# Patient Record
Sex: FEMALE | Race: BLACK OR AFRICAN AMERICAN | NOT HISPANIC OR LATINO | URBAN - METROPOLITAN AREA
[De-identification: names, ages, dates, MRNs, and addresses within clinical notes are randomized per-mention and may not be internally consistent; named-entity substitution may affect disease eponyms.]

---

## 2017-03-20 ENCOUNTER — OUTPATIENT (OUTPATIENT)
Dept: OUTPATIENT SERVICES | Facility: HOSPITAL | Age: 77
LOS: 1 days | End: 2017-03-20
Payer: MEDICARE

## 2017-03-20 ENCOUNTER — APPOINTMENT (OUTPATIENT)
Dept: ULTRASOUND IMAGING | Facility: CLINIC | Age: 77
End: 2017-03-20

## 2017-03-20 DIAGNOSIS — Z00.8 ENCOUNTER FOR OTHER GENERAL EXAMINATION: ICD-10-CM

## 2017-03-20 PROCEDURE — 76536 US EXAM OF HEAD AND NECK: CPT

## 2017-03-21 ENCOUNTER — FORM ENCOUNTER (OUTPATIENT)
Age: 77
End: 2017-03-21

## 2017-12-11 ENCOUNTER — OUTPATIENT (OUTPATIENT)
Dept: OUTPATIENT SERVICES | Facility: HOSPITAL | Age: 77
LOS: 1 days | End: 2017-12-11
Payer: MEDICARE

## 2017-12-11 ENCOUNTER — APPOINTMENT (OUTPATIENT)
Dept: MAMMOGRAPHY | Facility: CLINIC | Age: 77
End: 2017-12-11
Payer: MEDICARE

## 2017-12-11 DIAGNOSIS — Z00.8 ENCOUNTER FOR OTHER GENERAL EXAMINATION: ICD-10-CM

## 2017-12-11 PROCEDURE — 77063 BREAST TOMOSYNTHESIS BI: CPT

## 2017-12-11 PROCEDURE — G0202: CPT | Mod: 26

## 2017-12-11 PROCEDURE — 77063 BREAST TOMOSYNTHESIS BI: CPT | Mod: 26

## 2017-12-11 PROCEDURE — 77067 SCR MAMMO BI INCL CAD: CPT

## 2018-03-05 ENCOUNTER — APPOINTMENT (OUTPATIENT)
Dept: ULTRASOUND IMAGING | Facility: CLINIC | Age: 78
End: 2018-03-05
Payer: MEDICARE

## 2018-03-05 ENCOUNTER — OUTPATIENT (OUTPATIENT)
Dept: OUTPATIENT SERVICES | Facility: HOSPITAL | Age: 78
LOS: 1 days | End: 2018-03-05
Payer: MEDICARE

## 2018-03-05 DIAGNOSIS — Z00.8 ENCOUNTER FOR OTHER GENERAL EXAMINATION: ICD-10-CM

## 2018-03-05 PROCEDURE — 76536 US EXAM OF HEAD AND NECK: CPT

## 2018-03-05 PROCEDURE — 76536 US EXAM OF HEAD AND NECK: CPT | Mod: 26

## 2018-11-01 ENCOUNTER — APPOINTMENT (OUTPATIENT)
Dept: ULTRASOUND IMAGING | Facility: CLINIC | Age: 78
End: 2018-11-01
Payer: MEDICARE

## 2018-11-01 ENCOUNTER — OUTPATIENT (OUTPATIENT)
Dept: OUTPATIENT SERVICES | Facility: HOSPITAL | Age: 78
LOS: 1 days | End: 2018-11-01
Payer: MEDICARE

## 2018-11-01 DIAGNOSIS — Z00.8 ENCOUNTER FOR OTHER GENERAL EXAMINATION: ICD-10-CM

## 2018-11-01 PROCEDURE — 76536 US EXAM OF HEAD AND NECK: CPT | Mod: 26

## 2018-11-01 PROCEDURE — 76536 US EXAM OF HEAD AND NECK: CPT

## 2019-03-22 ENCOUNTER — APPOINTMENT (OUTPATIENT)
Dept: ULTRASOUND IMAGING | Facility: CLINIC | Age: 79
End: 2019-03-22
Payer: MEDICARE

## 2019-03-22 ENCOUNTER — OUTPATIENT (OUTPATIENT)
Dept: OUTPATIENT SERVICES | Facility: HOSPITAL | Age: 79
LOS: 1 days | End: 2019-03-22
Payer: MEDICARE

## 2019-03-22 ENCOUNTER — APPOINTMENT (OUTPATIENT)
Dept: MAMMOGRAPHY | Facility: CLINIC | Age: 79
End: 2019-03-22
Payer: MEDICARE

## 2019-03-22 ENCOUNTER — APPOINTMENT (OUTPATIENT)
Dept: RADIOLOGY | Facility: CLINIC | Age: 79
End: 2019-03-22
Payer: MEDICARE

## 2019-03-22 DIAGNOSIS — Z00.8 ENCOUNTER FOR OTHER GENERAL EXAMINATION: ICD-10-CM

## 2019-03-22 PROCEDURE — 77063 BREAST TOMOSYNTHESIS BI: CPT | Mod: 26

## 2019-03-22 PROCEDURE — 77080 DXA BONE DENSITY AXIAL: CPT | Mod: 26

## 2019-03-22 PROCEDURE — 77063 BREAST TOMOSYNTHESIS BI: CPT

## 2019-03-22 PROCEDURE — 76536 US EXAM OF HEAD AND NECK: CPT | Mod: 26

## 2019-03-22 PROCEDURE — 77080 DXA BONE DENSITY AXIAL: CPT

## 2019-03-22 PROCEDURE — 77067 SCR MAMMO BI INCL CAD: CPT | Mod: 26

## 2019-03-22 PROCEDURE — 76536 US EXAM OF HEAD AND NECK: CPT

## 2019-03-22 PROCEDURE — 77067 SCR MAMMO BI INCL CAD: CPT

## 2020-01-13 ENCOUNTER — RESULT REVIEW (OUTPATIENT)
Age: 80
End: 2020-01-13

## 2020-08-17 ENCOUNTER — APPOINTMENT (OUTPATIENT)
Dept: MAMMOGRAPHY | Facility: CLINIC | Age: 80
End: 2020-08-17
Payer: MEDICARE

## 2020-08-17 ENCOUNTER — OUTPATIENT (OUTPATIENT)
Dept: OUTPATIENT SERVICES | Facility: HOSPITAL | Age: 80
LOS: 1 days | End: 2020-08-17
Payer: MEDICARE

## 2020-08-17 DIAGNOSIS — Z00.8 ENCOUNTER FOR OTHER GENERAL EXAMINATION: ICD-10-CM

## 2020-08-17 PROCEDURE — 77067 SCR MAMMO BI INCL CAD: CPT | Mod: 26

## 2020-08-17 PROCEDURE — 77063 BREAST TOMOSYNTHESIS BI: CPT | Mod: 26

## 2020-08-17 PROCEDURE — 77067 SCR MAMMO BI INCL CAD: CPT

## 2020-08-17 PROCEDURE — 77063 BREAST TOMOSYNTHESIS BI: CPT

## 2020-08-28 ENCOUNTER — APPOINTMENT (OUTPATIENT)
Dept: OBGYN | Facility: CLINIC | Age: 80
End: 2020-08-28
Payer: MEDICARE

## 2020-08-28 VITALS
SYSTOLIC BLOOD PRESSURE: 150 MMHG | WEIGHT: 187 LBS | DIASTOLIC BLOOD PRESSURE: 86 MMHG | HEIGHT: 60 IN | BODY MASS INDEX: 36.71 KG/M2

## 2020-08-28 PROCEDURE — 99202 OFFICE O/P NEW SF 15 MIN: CPT

## 2020-10-01 ENCOUNTER — APPOINTMENT (OUTPATIENT)
Dept: UROGYNECOLOGY | Facility: CLINIC | Age: 80
End: 2020-10-01
Payer: MEDICARE

## 2020-10-01 VITALS
HEIGHT: 61 IN | BODY MASS INDEX: 35.68 KG/M2 | WEIGHT: 189 LBS | SYSTOLIC BLOOD PRESSURE: 148 MMHG | DIASTOLIC BLOOD PRESSURE: 84 MMHG

## 2020-10-01 DIAGNOSIS — R33.9 RETENTION OF URINE, UNSPECIFIED: ICD-10-CM

## 2020-10-01 DIAGNOSIS — R39.15 URGENCY OF URINATION: ICD-10-CM

## 2020-10-01 DIAGNOSIS — Z86.018 PERSONAL HISTORY OF OTHER BENIGN NEOPLASM: ICD-10-CM

## 2020-10-01 DIAGNOSIS — Z86.39 PERSONAL HISTORY OF OTHER ENDOCRINE, NUTRITIONAL AND METABOLIC DISEASE: ICD-10-CM

## 2020-10-01 DIAGNOSIS — Z82.3 FAMILY HISTORY OF STROKE: ICD-10-CM

## 2020-10-01 DIAGNOSIS — Z60.2 PROBLEMS RELATED TO LIVING ALONE: ICD-10-CM

## 2020-10-01 DIAGNOSIS — R35.0 FREQUENCY OF MICTURITION: ICD-10-CM

## 2020-10-01 DIAGNOSIS — K46.9 UNSPECIFIED ABDOMINAL HERNIA W/OUT OBSTRUCTION OR GANGRENE: ICD-10-CM

## 2020-10-01 DIAGNOSIS — Z86.79 PERSONAL HISTORY OF OTHER DISEASES OF THE CIRCULATORY SYSTEM: ICD-10-CM

## 2020-10-01 DIAGNOSIS — N81.9 FEMALE GENITAL PROLAPSE, UNSPECIFIED: ICD-10-CM

## 2020-10-01 DIAGNOSIS — R35.1 NOCTURIA: ICD-10-CM

## 2020-10-01 DIAGNOSIS — Z86.69 PERSONAL HISTORY OF OTHER DISEASES OF THE NERVOUS SYSTEM AND SENSE ORGANS: ICD-10-CM

## 2020-10-01 LAB
BILIRUB UR QL STRIP: NEGATIVE
CLARITY UR: CLEAR
COLLECTION METHOD: NORMAL
GLUCOSE UR-MCNC: 100
HCG UR QL: 0.2 EU/DL
HGB UR QL STRIP.AUTO: NORMAL
KETONES UR-MCNC: NEGATIVE
LEUKOCYTE ESTERASE UR QL STRIP: NORMAL
NITRITE UR QL STRIP: NEGATIVE
PH UR STRIP: 5.5
PROT UR STRIP-MCNC: NEGATIVE
SP GR UR STRIP: 1.01

## 2020-10-01 PROCEDURE — 51701 INSERT BLADDER CATHETER: CPT

## 2020-10-01 PROCEDURE — 99204 OFFICE O/P NEW MOD 45 MIN: CPT | Mod: 25

## 2020-10-01 PROCEDURE — 81003 URINALYSIS AUTO W/O SCOPE: CPT | Mod: QW

## 2020-10-01 PROCEDURE — 99214 OFFICE O/P EST MOD 30 MIN: CPT | Mod: 25

## 2020-10-01 SDOH — SOCIAL STABILITY - SOCIAL INSECURITY: PROBLEMS RELATED TO LIVING ALONE: Z60.2

## 2020-10-01 NOTE — OB HISTORY
[Vaginal ___] : [unfilled] vaginal delivery(s) [Definite ___ (Date)] : the last menstrual period was [unfilled] [Last Pap Smear ___] : date of last pap smear was on [unfilled] [Abnormal Pap Smear] : normal pap smear [Taking Estrogens] : is not taking estrogen replacement [Sexually Active] : is not sexually active [FreeTextEntry1] : Largest baby 7#.

## 2020-10-01 NOTE — LETTER BODY
[Dear  ___] : Dear  [unfilled], [Attached please find my note.] : Attached please find my note. [Thank you very much for allowing me to participate in the care of this patient. If you have any questions, please do not hesitate to contact me] : Thank you very much for allowing me to participate in the care of this patient. If you have any questions, please do not hesitate to contact me. [DrThomas  ___] : Dr. STONER

## 2020-10-01 NOTE — DISCUSSION/SUMMARY
[FreeTextEntry1] : Ms. FRANCIS is a 80-year-old with uterovaginal prolapse, urinary frequency, urgency, rare leakage of urine, attenuated perineal body, hematuria clean-catch. We talked about the etiology and natural progression of pelvic organ prolapse. We discussed the treatment options of a pessary, physical therapy or surgery. In terms of surgery we talked about open procedures, robotic assisted procedures and vaginal reconstruction with or without the utilization of graft material. I recommended a pelvic U/S. I gave her some literature on pessary and POP. I sent her cath'd urine to the lab. I was able to answer all of her questions. At this time she is going to be helping her grand daughter in [place] with her new baby. She will come back in town next year and revisit us for a treatment plan. \par

## 2020-10-01 NOTE — PHYSICAL EXAM
[Chaperone Present] : A chaperone was present in the examining room during all aspects of the physical examination [No Acute Distress] : in no acute distress [Well developed] : well developed [Well Nourished] : ~L well nourished [Oriented x3] : oriented to person, place, and time [No Edema] : ~T edema was not present [Scar] : a scar was noted [Warm and Dry] : was warm and dry to touch [Normal Gait] : gait was normal [Normal Appearance] : general appearance was normal [Atrophy] : atrophy [Attentuated] : perineal body was attenuated [3] : 3 [Aa ____] : Aa [unfilled] [Ba ____] : Ba [unfilled] [C ____] : C [unfilled] [GH ____] : GH [unfilled] [PB ____] : PB [unfilled] [TVL ____] : TVL  [unfilled] [Ap ____] : Ap [unfilled] [Bp ____] : Bp [unfilled] [D ____] : D [unfilled] [Normal] : no abnormalities [Post Void Residual ____ml] : post void residual was [unfilled] ml [Cough] : no cough [Tenderness] : ~T no ~M abdominal tenderness observed [Distended] : not distended [Hernia] : no hernia observed

## 2020-10-01 NOTE — REASON FOR VISIT
[Pelvic Organ Prolapse] : pelvic organ prolapse [Cannot Empty Bladder] : cannot empty bladder [Urine Frequency] : urine frequency

## 2020-10-01 NOTE — HISTORY OF PRESENT ILLNESS
[FreeTextEntry1] : 81 yo [facility] female with complaints of prolapse. First noticed a couple of years ago and it is getting progressively worse. She denies any pain and sometimes feels pressure. She does admit that the prolapse is worse sometimes. She used to be able to push it back in and now she can't. Sometimes feels she is not able to fully empty her bladder. No leaking with sneeze or cough. Very rarely she will leak with a strong urge. Sometimes she feels she has to void frequently and sometimes will have strong urgency. Gets up 4 times at night. Is diabetic and takes water in the morning. Rare constipation. Has glaucoma but not sure what kind it is.

## 2020-10-02 LAB
APPEARANCE: CLEAR
BACTERIA: NEGATIVE
BILIRUBIN URINE: NEGATIVE
BLOOD URINE: NEGATIVE
COLOR: COLORLESS
GLUCOSE QUALITATIVE U: ABNORMAL
HYALINE CASTS: 0 /LPF
KETONES URINE: NEGATIVE
LEUKOCYTE ESTERASE URINE: NEGATIVE
MICROSCOPIC-UA: NORMAL
NITRITE URINE: NEGATIVE
PH URINE: 6
PROTEIN URINE: NEGATIVE
RED BLOOD CELLS URINE: 0 /HPF
SPECIFIC GRAVITY URINE: 1.01
SQUAMOUS EPITHELIAL CELLS: 0 /HPF
UROBILINOGEN URINE: NORMAL
WHITE BLOOD CELLS URINE: 0 /HPF

## 2020-10-05 LAB
BACTERIA UR CULT: NORMAL
URINE CYTOLOGY: NORMAL

## 2021-07-15 ENCOUNTER — APPOINTMENT (OUTPATIENT)
Dept: CARDIOLOGY | Facility: CLINIC | Age: 81
End: 2021-07-15
Payer: MEDICARE

## 2021-07-15 ENCOUNTER — NON-APPOINTMENT (OUTPATIENT)
Age: 81
End: 2021-07-15

## 2021-07-15 VITALS
WEIGHT: 185 LBS | BODY MASS INDEX: 34.93 KG/M2 | OXYGEN SATURATION: 97 % | HEIGHT: 61 IN | SYSTOLIC BLOOD PRESSURE: 145 MMHG | DIASTOLIC BLOOD PRESSURE: 93 MMHG | HEART RATE: 69 BPM

## 2021-07-15 DIAGNOSIS — R94.31 ABNORMAL ELECTROCARDIOGRAM [ECG] [EKG]: ICD-10-CM

## 2021-07-15 DIAGNOSIS — G47.9 SLEEP DISORDER, UNSPECIFIED: ICD-10-CM

## 2021-07-15 PROCEDURE — 99205 OFFICE O/P NEW HI 60 MIN: CPT

## 2021-07-15 PROCEDURE — 93000 ELECTROCARDIOGRAM COMPLETE: CPT

## 2021-07-15 PROCEDURE — 99072 ADDL SUPL MATRL&STAF TM PHE: CPT

## 2021-07-15 RX ORDER — HYDROCHLOROTHIAZIDE 25 MG/1
25 TABLET ORAL DAILY
Qty: 30 | Refills: 8 | Status: ACTIVE | COMMUNITY

## 2021-07-15 RX ORDER — DORZOLAMIDE HYDROCHLORIDE 20 MG/ML
2 SOLUTION OPHTHALMIC
Refills: 0 | Status: ACTIVE | COMMUNITY

## 2021-07-15 RX ORDER — AMLODIPINE BESYLATE 10 MG/1
10 TABLET ORAL DAILY
Refills: 0 | Status: ACTIVE | COMMUNITY

## 2021-07-15 RX ORDER — METFORMIN HYDROCHLORIDE 500 MG/1
500 TABLET, COATED ORAL DAILY
Refills: 0 | Status: ACTIVE | COMMUNITY

## 2021-07-15 RX ORDER — LATANOPROST/PF 0.005 %
0.01 DROPS OPHTHALMIC (EYE)
Refills: 0 | Status: ACTIVE | COMMUNITY

## 2021-07-15 RX ORDER — ROSUVASTATIN CALCIUM 20 MG/1
20 TABLET, FILM COATED ORAL DAILY
Refills: 0 | Status: ACTIVE | COMMUNITY

## 2021-07-15 RX ORDER — ASPIRIN 81 MG/1
81 TABLET, CHEWABLE ORAL
Refills: 0 | Status: ACTIVE | COMMUNITY

## 2021-07-15 RX ORDER — ENALAPRIL MALEATE 20 MG/1
20 TABLET ORAL DAILY
Refills: 0 | Status: ACTIVE | COMMUNITY

## 2021-07-15 NOTE — HISTORY OF PRESENT ILLNESS
[FreeTextEntry1] : referred for ECG w/ LBBB & need for medical clearance\par \par 81 y/o\par -HTN\par -DM on metformin.\par -no other cardiac history\par \par ECG today shows LBBB\par PCP informed pt this is new compared w/ prior ECGs.\par \par Reports some dyspnea as noted by PCP.\par Can walk 4 city blocks & 2 flights of stairs.\par Can do ADLs w/o difficulty.  Walking briskly causes some dyspnea.\par Overall mild dyspnea not impairing daily activities.\par \par No chest discomfort.  No palpitations, no syncope.\par Reports some difficulty sleep, snores when sleeps at night.\par Some daytime fatigue (?)\par \par Saw Dr. Clark a few years back.\par May have had a stress test then - no records available.\par Then discharged from care.

## 2021-07-15 NOTE — ASSESSMENT
[FreeTextEntry1] : A/P:\par \par *LBBB-new on ECG\par -mildly symptomatic - reports dyspnea (see below), no chest pain.\par -Echo\par -lexiscan stress test\par \par *Dyspnea\par -no PND orthopnea weight gain.\par -testing as above \par -also BNP\par \par *sleep disorder\par -sleep study ordered\par \par Return 2 weeks to review results.\par

## 2021-07-23 ENCOUNTER — APPOINTMENT (OUTPATIENT)
Dept: CARDIOLOGY | Facility: CLINIC | Age: 81
End: 2021-07-23
Payer: MEDICARE

## 2021-07-23 PROCEDURE — 93306 TTE W/DOPPLER COMPLETE: CPT

## 2021-07-23 PROCEDURE — 99072 ADDL SUPL MATRL&STAF TM PHE: CPT

## 2021-07-25 LAB
ALBUMIN SERPL ELPH-MCNC: 4.4 G/DL
ALP BLD-CCNC: 57 U/L
ALT SERPL-CCNC: 8 U/L
ANION GAP SERPL CALC-SCNC: 11 MMOL/L
AST SERPL-CCNC: 14 U/L
BASOPHILS # BLD AUTO: 0.07 K/UL
BASOPHILS NFR BLD AUTO: 1.2 %
BILIRUB SERPL-MCNC: 0.6 MG/DL
BUN SERPL-MCNC: 14 MG/DL
CALCIUM SERPL-MCNC: 10.1 MG/DL
CHLORIDE SERPL-SCNC: 102 MMOL/L
CO2 SERPL-SCNC: 26 MMOL/L
CREAT SERPL-MCNC: 1.01 MG/DL
EOSINOPHIL # BLD AUTO: 0.23 K/UL
EOSINOPHIL NFR BLD AUTO: 3.8 %
GLUCOSE SERPL-MCNC: 124 MG/DL
HCT VFR BLD CALC: 41.5 %
HGB BLD-MCNC: 12.9 G/DL
IMM GRANULOCYTES NFR BLD AUTO: 0.2 %
LYMPHOCYTES # BLD AUTO: 2.48 K/UL
LYMPHOCYTES NFR BLD AUTO: 40.8 %
MAN DIFF?: NORMAL
MCHC RBC-ENTMCNC: 30.6 PG
MCHC RBC-ENTMCNC: 31.1 GM/DL
MCV RBC AUTO: 98.3 FL
MONOCYTES # BLD AUTO: 0.64 K/UL
MONOCYTES NFR BLD AUTO: 10.5 %
NEUTROPHILS # BLD AUTO: 2.65 K/UL
NEUTROPHILS NFR BLD AUTO: 43.5 %
NT-PROBNP SERPL-MCNC: 120 PG/ML
PLATELET # BLD AUTO: 305 K/UL
POTASSIUM SERPL-SCNC: 4 MMOL/L
PROT SERPL-MCNC: 7.1 G/DL
RBC # BLD: 4.22 M/UL
RBC # FLD: 12.7 %
SODIUM SERPL-SCNC: 139 MMOL/L
WBC # FLD AUTO: 6.08 K/UL

## 2021-08-11 ENCOUNTER — NON-APPOINTMENT (OUTPATIENT)
Age: 81
End: 2021-08-11

## 2021-08-12 ENCOUNTER — APPOINTMENT (OUTPATIENT)
Dept: CARDIOLOGY | Facility: CLINIC | Age: 81
End: 2021-08-12
Payer: MEDICARE

## 2021-08-12 PROCEDURE — 93015 CV STRESS TEST SUPVJ I&R: CPT

## 2021-08-12 PROCEDURE — A9500: CPT

## 2021-08-12 PROCEDURE — 78452 HT MUSCLE IMAGE SPECT MULT: CPT

## 2021-08-18 ENCOUNTER — NON-APPOINTMENT (OUTPATIENT)
Age: 81
End: 2021-08-18

## 2021-08-26 ENCOUNTER — APPOINTMENT (OUTPATIENT)
Dept: VASCULAR SURGERY | Facility: CLINIC | Age: 81
End: 2021-08-26

## 2021-08-31 ENCOUNTER — APPOINTMENT (OUTPATIENT)
Dept: VASCULAR SURGERY | Facility: CLINIC | Age: 81
End: 2021-08-31

## 2021-09-03 ENCOUNTER — NON-APPOINTMENT (OUTPATIENT)
Age: 81
End: 2021-09-03

## 2021-09-03 ENCOUNTER — APPOINTMENT (OUTPATIENT)
Dept: CARDIOLOGY | Facility: CLINIC | Age: 81
End: 2021-09-03
Payer: MEDICARE

## 2021-09-03 VITALS
SYSTOLIC BLOOD PRESSURE: 144 MMHG | HEART RATE: 59 BPM | HEIGHT: 61 IN | BODY MASS INDEX: 36.25 KG/M2 | OXYGEN SATURATION: 97 % | DIASTOLIC BLOOD PRESSURE: 84 MMHG | WEIGHT: 192 LBS

## 2021-09-03 DIAGNOSIS — I10 ESSENTIAL (PRIMARY) HYPERTENSION: ICD-10-CM

## 2021-09-03 PROCEDURE — 93000 ELECTROCARDIOGRAM COMPLETE: CPT

## 2021-09-03 PROCEDURE — 99215 OFFICE O/P EST HI 40 MIN: CPT

## 2021-09-03 NOTE — HISTORY OF PRESENT ILLNESS
[FreeTextEntry1] : 79 y/o\par -HTN\par -DM on metformin.\par -no other cardiac history\par \par \par here for followup.\par \par Reviewed results of testing:\par \par MPI Aug '21: normal perfusion & EF\par Echo Aug '21:  normal EF, mild LVH, nl LA, mild pul HTN "mild diast dysfx"\par BNP Aug '21 nl\par \par Reviewed symptoms: mild dyspnea w/ brisk walking.\par Reports hx HTN since age 22 started on meds then.

## 2021-09-03 NOTE — ASSESSMENT
[FreeTextEntry1] : A/P:\par \par *Preop\par -may proceed w/ cataract surgery\par -low risk for cardiac events for low risk procedure.\par \par *LBBB-new on ECG\par -Echo & stress neg\par -likely age related conduction changes no further workup.\par \par *Dyspnea\par -mild may have mild HFpEF\par -cont. current meds\par \par *HTN\par -given 4 different BP meds & early age of onset\par consider 2ndary causes HTN\par -sleep study reordered\par -scottie/renin\par -renal US\par \par \par Return in 1 month.

## 2021-09-07 ENCOUNTER — APPOINTMENT (OUTPATIENT)
Dept: VASCULAR SURGERY | Facility: CLINIC | Age: 81
End: 2021-09-07
Payer: MEDICARE

## 2021-09-07 VITALS
DIASTOLIC BLOOD PRESSURE: 86 MMHG | SYSTOLIC BLOOD PRESSURE: 145 MMHG | HEIGHT: 61 IN | BODY MASS INDEX: 35.3 KG/M2 | WEIGHT: 187 LBS | OXYGEN SATURATION: 98 % | HEART RATE: 70 BPM

## 2021-09-07 DIAGNOSIS — R20.2 ANESTHESIA OF SKIN: ICD-10-CM

## 2021-09-07 DIAGNOSIS — R20.0 ANESTHESIA OF SKIN: ICD-10-CM

## 2021-09-07 PROCEDURE — 99203 OFFICE O/P NEW LOW 30 MIN: CPT

## 2021-09-08 PROBLEM — R20.0 NUMBNESS AND TINGLING OF FOOT: Status: ACTIVE | Noted: 2021-09-08

## 2021-09-08 NOTE — HISTORY OF PRESENT ILLNESS
[FreeTextEntry1] : 81 year old woman referred for evaluation for PAD.\par Patient reports that over the last few months she has been experiencing numbness on her right leg, particularly over her anterior thigh. Symptoms are worse with standing. She denies calf or thigh cramping and can walk 1 mile without pain. She does not smoke.\par She has a long history of varicose veins and uses compression stockings.\par Of note, patient has also developed worsening low back pain during the same period.\par PMH significant for Type 2 DM

## 2021-09-08 NOTE — ASSESSMENT
[FreeTextEntry1] : 81 year old diabetic woman referred for evaluation of right leg numbness.\par She denies intermittent claudication or ischemic rest pain.\par Her pedal pulses are easily palpable and non invasive vascular testing shows normal EVIN and PVR bilaterally\par -Patient does not have PAD and her symptoms are not arterial in nature\par -I suspect her symptoms are due to lumbar radiculopathy. She does not require any further vascular work up. She may return PRN

## 2021-09-08 NOTE — PHYSICAL EXAM
[JVD] : no jugular venous distention  [Normal Breath Sounds] : Normal breath sounds [Normal Rate and Rhythm] : normal rate and rhythm [2+] : left 2+ [Ankle Swelling (On Exam)] : not present [Varicose Veins Of Lower Extremities] : bilaterally [Ankle Swelling On The Left] : moderate [] : not present [Abdomen Masses] : No abdominal masses [Abdomen Tenderness] : ~T ~M No abdominal tenderness [No Rash or Lesion] : No rash or lesion [Alert] : alert [Oriented to Person] : oriented to person [Oriented to Place] : oriented to place [Oriented to Time] : oriented to time [Calm] : calm [de-identified] : Well appearing [de-identified] : NCAT

## 2021-10-08 ENCOUNTER — APPOINTMENT (OUTPATIENT)
Dept: CARDIOLOGY | Facility: CLINIC | Age: 81
End: 2021-10-08
Payer: MEDICARE

## 2021-10-08 ENCOUNTER — NON-APPOINTMENT (OUTPATIENT)
Age: 81
End: 2021-10-08

## 2021-10-08 VITALS
SYSTOLIC BLOOD PRESSURE: 162 MMHG | HEIGHT: 61 IN | HEART RATE: 73 BPM | OXYGEN SATURATION: 98 % | DIASTOLIC BLOOD PRESSURE: 93 MMHG | WEIGHT: 191 LBS | BODY MASS INDEX: 36.06 KG/M2

## 2021-10-08 PROCEDURE — 93000 ELECTROCARDIOGRAM COMPLETE: CPT

## 2021-10-08 PROCEDURE — 99214 OFFICE O/P EST MOD 30 MIN: CPT

## 2021-10-08 NOTE — HISTORY OF PRESENT ILLNESS
[FreeTextEntry1] : 81 y/o\par \par -HTN-on 4 meds\par -DM on metformin.\par -no other cardiac history\par \par here for followup.\par initially referred Jul '21 for LBBB & preop eval. for cataract,\par testing unremarkable.\par \par Last visit workup for 2ndary causes of HTN ordered.\par Reviewed scottie renin reviewed - scottie normal, renin reduced scottie/renin 24.6\par Did not get sleep study - [facility]\par Did not get renal US - pt says she can't afford\par Had cataract surgery on R eye, holding off on left for now b/c of money.\par \par No cardiac symptoms: no chest pain, dyspnea, palpitations, syncope, lightheadness, LE edema.\par \par MPI Aug '21: normal perfusion & EF\par Echo Aug '21: normal EF, mild LVH, nl LA, mild pul HTN "mild diast dysfx"\par BNP Aug '21 nl

## 2021-10-08 NOTE — PHYSICAL EXAM

## 2021-10-08 NOTE — ASSESSMENT
[FreeTextEntry1] : A/P:\par \par *Preop\par -may proceed w/ 2nd cataract surgery when pt is ready\par -low risk for cardiac events for low risk procedure.\par \par *LBBB-new on ECG\par -Echo & stress neg\par -likely age related conduction changes no further workup.\par \par *Dyspnea\par -mild may have mild HFpEF\par -cont. current meds\par \par *HTN\par -given 4 different BP meds & early age of onset\par consider 2ndary causes HTN\par -[facility] referred to Dr. Valdez\par -scottie/renin -borderline\par -renal US - cant pay for this will defer for now.\par \par \par Return in 3-4 months if BPs elevated consider adding saba.

## 2021-11-01 LAB
ALDOSTERONE SERUM: 7.4 NG/DL
RENIN ACTIVITY, PLASMA: 0.36 NG/ML/HR

## 2022-01-03 ENCOUNTER — APPOINTMENT (OUTPATIENT)
Dept: RADIOLOGY | Facility: CLINIC | Age: 82
End: 2022-01-03
Payer: MEDICARE

## 2022-01-03 ENCOUNTER — APPOINTMENT (OUTPATIENT)
Dept: ULTRASOUND IMAGING | Facility: CLINIC | Age: 82
End: 2022-01-03
Payer: MEDICARE

## 2022-01-03 ENCOUNTER — OUTPATIENT (OUTPATIENT)
Dept: OUTPATIENT SERVICES | Facility: HOSPITAL | Age: 82
LOS: 1 days | End: 2022-01-03
Payer: MEDICARE

## 2022-01-03 ENCOUNTER — APPOINTMENT (OUTPATIENT)
Dept: MAMMOGRAPHY | Facility: CLINIC | Age: 82
End: 2022-01-03
Payer: MEDICARE

## 2022-01-03 DIAGNOSIS — Z00.8 ENCOUNTER FOR OTHER GENERAL EXAMINATION: ICD-10-CM

## 2022-01-03 PROCEDURE — 76536 US EXAM OF HEAD AND NECK: CPT | Mod: 26

## 2022-01-03 PROCEDURE — 77067 SCR MAMMO BI INCL CAD: CPT | Mod: 26

## 2022-01-03 PROCEDURE — 77063 BREAST TOMOSYNTHESIS BI: CPT | Mod: 26

## 2022-01-03 PROCEDURE — 77080 DXA BONE DENSITY AXIAL: CPT | Mod: 26

## 2022-01-03 PROCEDURE — 77063 BREAST TOMOSYNTHESIS BI: CPT

## 2022-01-03 PROCEDURE — 76536 US EXAM OF HEAD AND NECK: CPT

## 2022-01-03 PROCEDURE — 77067 SCR MAMMO BI INCL CAD: CPT

## 2022-01-03 PROCEDURE — 77080 DXA BONE DENSITY AXIAL: CPT

## 2022-03-16 ENCOUNTER — NON-APPOINTMENT (OUTPATIENT)
Age: 82
End: 2022-03-16

## 2022-03-16 ENCOUNTER — APPOINTMENT (OUTPATIENT)
Dept: CARDIOLOGY | Facility: CLINIC | Age: 82
End: 2022-03-16
Payer: MEDICARE

## 2022-03-16 VITALS
OXYGEN SATURATION: 98 % | RESPIRATION RATE: 14 BRPM | WEIGHT: 188 LBS | SYSTOLIC BLOOD PRESSURE: 160 MMHG | DIASTOLIC BLOOD PRESSURE: 80 MMHG | BODY MASS INDEX: 35.5 KG/M2 | HEIGHT: 61 IN | HEART RATE: 62 BPM

## 2022-03-16 PROCEDURE — 99214 OFFICE O/P EST MOD 30 MIN: CPT

## 2022-03-16 PROCEDURE — 93000 ELECTROCARDIOGRAM COMPLETE: CPT

## 2022-03-16 RX ORDER — CARVEDILOL 6.25 MG/1
6.25 TABLET, FILM COATED ORAL TWICE DAILY
Qty: 62 | Refills: 3 | Status: ACTIVE | COMMUNITY
Start: 1900-01-01 | End: 1900-01-01

## 2022-03-21 NOTE — ASSESSMENT
[FreeTextEntry1] : A/P:\par \par *HTN\par -reduce coreg from 12.5 to 6.25, start saba 25 mg daily.\par -home sleep study.\par \par Return in 3 weeks w/ basic chem b/f visit.
No

## 2022-03-21 NOTE — PHYSICAL EXAM
[Well Developed] : well developed [Well Nourished] : well nourished [No Acute Distress] : no acute distress [Normal Conjunctiva] : normal conjunctiva [Normal Venous Pressure] : normal venous pressure [No Carotid Bruit] : no carotid bruit [Normal S1, S2] : normal S1, S2 [No Murmur] : no murmur [No Rub] : no rub [No Gallop] : no gallop [Clear Lung Fields] : clear lung fields [Good Air Entry] : good air entry [No Respiratory Distress] : no respiratory distress  [Non Tender] : non-tender [Soft] : abdomen soft [No Masses/organomegaly] : no masses/organomegaly [Normal Bowel Sounds] : normal bowel sounds [Normal Gait] : normal gait [No Edema] : no edema [No Cyanosis] : no cyanosis [No Clubbing] : no clubbing [No Varicosities] : no varicosities [No Skin Lesions] : no skin lesions [No Rash] : no rash [Moves all extremities] : moves all extremities [No Focal Deficits] : no focal deficits [Normal Speech] : normal speech [Alert and Oriented] : alert and oriented [Normal memory] : normal memory [de-identified] : obese

## 2022-03-21 NOTE — HISTORY OF PRESENT ILLNESS
[FreeTextEntry1] : 79 y/o\par \par -HTN-on 4 meds\par -DM on metformin.\par -no other cardiac history\par \par here for followup.\par Did not get sleep study yet.\par \par No cardiac symptoms: no chest pain, dyspnea, palpitations, \par syncope, lightheadness, LE edema.\par \par \par MPI Aug '21: normal perfusion & EF\par Echo Aug '21: normal EF, mild LVH, nl LA, mild pul HTN "mild diast dysfx"\par scottie normal, renin reduced scottie/renin 24.6\par BNP Aug '21 nl \par PCP Dr. Thomson\par

## 2022-04-06 ENCOUNTER — APPOINTMENT (OUTPATIENT)
Dept: CARDIOLOGY | Facility: CLINIC | Age: 82
End: 2022-04-06
Payer: MEDICARE

## 2022-04-06 VITALS
OXYGEN SATURATION: 99 % | SYSTOLIC BLOOD PRESSURE: 136 MMHG | HEIGHT: 61 IN | WEIGHT: 188 LBS | DIASTOLIC BLOOD PRESSURE: 78 MMHG | RESPIRATION RATE: 16 BRPM | HEART RATE: 67 BPM | BODY MASS INDEX: 35.5 KG/M2

## 2022-04-06 PROCEDURE — 93000 ELECTROCARDIOGRAM COMPLETE: CPT

## 2022-04-06 PROCEDURE — 99215 OFFICE O/P EST HI 40 MIN: CPT

## 2022-04-06 NOTE — HISTORY OF PRESENT ILLNESS
[FreeTextEntry1] : 81 y/o\par \par -HTN-on 4 meds-since age 27\par -DM on metformin.\par -no other cardiac history\par \par here for followup.\par Did not get sleep study - couldn't  machine.\par \par No cardiac symptoms: no chest pain, dyspnea, palpitations, \par syncope, lightheadness, LE edema.\par \par SBPs better 130s today in clinic & per pt at home.\par \par MPI Aug '21: normal perfusion & EF\par Echo Aug '21: normal EF, mild LVH, nl LA, mild pul HTN "mild diast dysfx"\par scottie normal, renin reduced scottie/renin 24.6\par BNP Aug '21 nl \par refused renal US - could not afford.\par \par PCP Dr. Thomson\par

## 2022-04-06 NOTE — ASSESSMENT
[FreeTextEntry1] : \par \par *HTN\par >>A:\par -BP now controlled after starting saba 25 daily\par & K on this WNL.\par -also on amlodipine 10 mg daily, coreg 6.25 BID,\par enalapril 20 daily, HCTZ 25 daily - total of 5 meds for HTN\par \par -Sushil/renin level slightly elevated 25.6, doesn't want \par surgery\par -renal US neg for JUMA - couldn't afford renal US\par -will not get 24 cuff - transportation limited b/c pt has to pay.\par \par >>P:\par -sleep study pending - staff will see if they can send to her house\par she won't pay for ride to get it.\par \par -------\par \par Return in 3 months, \par \par

## 2022-04-11 ENCOUNTER — RX CHANGE (OUTPATIENT)
Age: 82
End: 2022-04-11

## 2022-04-11 LAB
ANION GAP SERPL CALC-SCNC: 11 MMOL/L
BUN SERPL-MCNC: 18 MG/DL
CALCIUM SERPL-MCNC: 10.2 MG/DL
CHLORIDE SERPL-SCNC: 102 MMOL/L
CO2 SERPL-SCNC: 26 MMOL/L
CREAT SERPL-MCNC: 1.01 MG/DL
EGFR: 57 ML/MIN/1.73M2
GLUCOSE SERPL-MCNC: 112 MG/DL
POTASSIUM SERPL-SCNC: 4.5 MMOL/L
SODIUM SERPL-SCNC: 139 MMOL/L

## 2022-05-16 ENCOUNTER — RX CHANGE (OUTPATIENT)
Age: 82
End: 2022-05-16

## 2022-07-06 ENCOUNTER — APPOINTMENT (OUTPATIENT)
Dept: CARDIOLOGY | Facility: CLINIC | Age: 82
End: 2022-07-06

## 2022-09-14 ENCOUNTER — APPOINTMENT (OUTPATIENT)
Dept: CARDIOLOGY | Facility: CLINIC | Age: 82
End: 2022-09-14

## 2022-09-14 VITALS
DIASTOLIC BLOOD PRESSURE: 80 MMHG | HEIGHT: 61 IN | WEIGHT: 188 LBS | HEART RATE: 73 BPM | SYSTOLIC BLOOD PRESSURE: 170 MMHG | BODY MASS INDEX: 35.5 KG/M2 | RESPIRATION RATE: 16 BRPM | OXYGEN SATURATION: 99 %

## 2022-09-14 PROCEDURE — 99214 OFFICE O/P EST MOD 30 MIN: CPT | Mod: GC,25

## 2022-09-14 PROCEDURE — 93000 ELECTROCARDIOGRAM COMPLETE: CPT | Mod: GC

## 2022-09-14 NOTE — HISTORY OF PRESENT ILLNESS
[FreeTextEntry1] : 81 y/o\par initially referred for preop clearance given LBBB on ECG\par jul '21 continues to follow for severe HTN.\par \par -HTN-on 4 meds-since age 27\par -DM on metformin.\par -no other cardiac history\par \par here for followup.\par SBPs 170s initially then went down to 140.\par Pt reports home BPs sometimes >!130 occurs once a week.\par ran out of HCTZ 4 days ago will get refil in 3 days.\par \par No cardiac symptoms: no chest pain, dyspnea, palpitations, \par syncope, lightheadness, LE edema.\par \par reviewed BP meds:\par enalapril 20, amlodipine 10 mg, HCTZ 25, coreg 6.25\par spironolactone 25 daily.\par \par MPI Aug '21: normal perfusion & EF\par Echo Aug '21: normal EF, mild LVH, nl LA, mild pul HTN "mild diast dysfx"\par scottie normal, renin reduced scottie/renin 24.6\par BNP Aug '21 nl \par refused renal US - could not afford.\par could not get sleep study at home & could not get pt to go to center.\par \par PCP Dr. Thomson\par

## 2022-09-14 NOTE — ASSESSMENT
[FreeTextEntry1] : A/P\par \par *HTN\par -BP close to goal not optimal\par -increase saba from 25 to 50 daily \par basic chem in 1 month prior to next visit\par -cont other meds\par \par -Note following workup for 2ndary causes of HTN:\par >scottie/renin 25.6 - wouldn't be interested ins urgery\par >couldn't afford renal US, couldn't get sleep study\par

## 2022-10-20 ENCOUNTER — APPOINTMENT (OUTPATIENT)
Dept: CARDIOLOGY | Facility: CLINIC | Age: 82
End: 2022-10-20

## 2022-10-20 VITALS
DIASTOLIC BLOOD PRESSURE: 84 MMHG | SYSTOLIC BLOOD PRESSURE: 138 MMHG | HEART RATE: 69 BPM | WEIGHT: 188 LBS | OXYGEN SATURATION: 98 % | RESPIRATION RATE: 16 BRPM | BODY MASS INDEX: 35.5 KG/M2 | HEIGHT: 61 IN

## 2022-10-20 PROCEDURE — 99214 OFFICE O/P EST MOD 30 MIN: CPT | Mod: 25

## 2022-10-20 PROCEDURE — 93000 ELECTROCARDIOGRAM COMPLETE: CPT

## 2022-10-28 NOTE — ASSESSMENT
[FreeTextEntry1] : A/P:\par \par Cont. current meds\par Return in 4 months basic chem & mag\par next visit consider increasing saba.

## 2022-10-28 NOTE — HISTORY OF PRESENT ILLNESS
[FreeTextEntry1] : 79 y/o\par initially referred for preop clearance given LBBB on ECG\par jul '21 continues to follow for severe HTN.\par \par -HTN-on 4 meds-since age 27\par -DM on metformin.\par -no other cardiac history\par \par here for followup.\par No cardiac complaints.\par BPs controlled.\par \par MPI Aug '21: normal perfusion & EF\par Echo Aug '21: normal EF, mild LVH, nl LA, mild pul HTN "mild diast dysfx"\par scottie normal, renin reduced scottie/renin 24.6\par BNP Aug '21 nl \par refused renal US - could not afford.\par could not get sleep study at home & could not get pt to go to center.\par \par PCP Dr. Thomson

## 2022-11-23 LAB
ANION GAP SERPL CALC-SCNC: 13 MMOL/L
BUN SERPL-MCNC: 19 MG/DL
CALCIUM SERPL-MCNC: 9.6 MG/DL
CHLORIDE SERPL-SCNC: 104 MMOL/L
CO2 SERPL-SCNC: 26 MMOL/L
CREAT SERPL-MCNC: 1.26 MG/DL
EGFR: 43 ML/MIN/1.73M2
POTASSIUM SERPL-SCNC: 4.4 MMOL/L
SODIUM SERPL-SCNC: 142 MMOL/L

## 2023-01-26 ENCOUNTER — OUTPATIENT (OUTPATIENT)
Dept: OUTPATIENT SERVICES | Facility: HOSPITAL | Age: 83
LOS: 1 days | End: 2023-01-26
Payer: MEDICARE

## 2023-01-26 ENCOUNTER — APPOINTMENT (OUTPATIENT)
Dept: MAMMOGRAPHY | Facility: CLINIC | Age: 83
End: 2023-01-26
Payer: MEDICARE

## 2023-01-26 DIAGNOSIS — Z00.8 ENCOUNTER FOR OTHER GENERAL EXAMINATION: ICD-10-CM

## 2023-01-26 PROCEDURE — 77067 SCR MAMMO BI INCL CAD: CPT | Mod: 26

## 2023-01-26 PROCEDURE — 77063 BREAST TOMOSYNTHESIS BI: CPT

## 2023-01-26 PROCEDURE — 77063 BREAST TOMOSYNTHESIS BI: CPT | Mod: 26

## 2023-01-26 PROCEDURE — 77067 SCR MAMMO BI INCL CAD: CPT

## 2023-02-16 ENCOUNTER — NON-APPOINTMENT (OUTPATIENT)
Age: 83
End: 2023-02-16

## 2023-02-16 ENCOUNTER — APPOINTMENT (OUTPATIENT)
Dept: CARDIOLOGY | Facility: CLINIC | Age: 83
End: 2023-02-16
Payer: MEDICARE

## 2023-02-16 VITALS
HEART RATE: 68 BPM | DIASTOLIC BLOOD PRESSURE: 80 MMHG | OXYGEN SATURATION: 98 % | SYSTOLIC BLOOD PRESSURE: 138 MMHG | HEIGHT: 61 IN | WEIGHT: 190 LBS | BODY MASS INDEX: 35.87 KG/M2

## 2023-02-16 VITALS — SYSTOLIC BLOOD PRESSURE: 120 MMHG | DIASTOLIC BLOOD PRESSURE: 74 MMHG

## 2023-02-16 PROCEDURE — 99214 OFFICE O/P EST MOD 30 MIN: CPT | Mod: 25

## 2023-02-16 PROCEDURE — 93000 ELECTROCARDIOGRAM COMPLETE: CPT

## 2023-02-16 NOTE — HISTORY OF PRESENT ILLNESS
[FreeTextEntry1] : 81 y/o\par initially referred for preop clearance given LBBB on ECG\par jul '21 continues to follow for severe HTN.\par \par -HTN-on 4 meds-since age 27\par -DM on metformin.\par -no other cardiac history\par \par here for followup.\par No cardiac complaints.\par BPs controlled better than prior visits.\par \par ECG NSR LBBB\par MPI Aug '21: normal perfusion & EF\par Echo Aug '21: normal EF, mild LVH, nl LA, mild pul HTN "mild diast dysfx"\par scottie normal, renin reduced scottie/renin 24.6\par BNP Aug '21 nl \par refused renal US - could not afford.\par could not get sleep study at home & could not get pt to go to center.\par \par PCP Dr. Thomson \par

## 2023-02-16 NOTE — ASSESSMENT
[FreeTextEntry1] : A/P:\par \par Cont. current meds.\par \par Return in 6 months w/ labs prior to visit.

## 2023-07-14 RX ORDER — SPIRONOLACTONE 50 MG/1
50 TABLET ORAL DAILY
Qty: 90 | Refills: 0 | Status: ACTIVE | COMMUNITY
Start: 2022-03-16 | End: 1900-01-01

## 2023-07-30 LAB
ANION GAP SERPL CALC-SCNC: 9 MMOL/L
BUN SERPL-MCNC: 24 MG/DL
CALCIUM SERPL-MCNC: 10.1 MG/DL
CHLORIDE SERPL-SCNC: 101 MMOL/L
CO2 SERPL-SCNC: 26 MMOL/L
CREAT SERPL-MCNC: 1.17 MG/DL
EGFR: 47 ML/MIN/1.73M2
GLUCOSE SERPL-MCNC: 105 MG/DL
MAGNESIUM SERPL-MCNC: 2 MG/DL
POTASSIUM SERPL-SCNC: 4.6 MMOL/L
SODIUM SERPL-SCNC: 136 MMOL/L

## 2023-08-24 ENCOUNTER — APPOINTMENT (OUTPATIENT)
Dept: CARDIOLOGY | Facility: CLINIC | Age: 83
End: 2023-08-24
Payer: MEDICARE

## 2023-08-24 VITALS
HEART RATE: 65 BPM | HEIGHT: 61 IN | BODY MASS INDEX: 35.87 KG/M2 | WEIGHT: 190 LBS | SYSTOLIC BLOOD PRESSURE: 158 MMHG | DIASTOLIC BLOOD PRESSURE: 83 MMHG | OXYGEN SATURATION: 97 % | RESPIRATION RATE: 16 BRPM

## 2023-08-24 PROCEDURE — 93000 ELECTROCARDIOGRAM COMPLETE: CPT

## 2023-08-24 PROCEDURE — 99214 OFFICE O/P EST MOD 30 MIN: CPT

## 2023-08-24 NOTE — HISTORY OF PRESENT ILLNESS
[FreeTextEntry1] : 79 y/o initially referred for preop clearance given LBBB on ECG jul '21 continues to follow for severe HTN.  -HTN-on 4 meds-since age 27 -DM on metformin. -no other cardiac history  here for followup. No cardiac complaints. BP by , by my measurement 140-135. She reports home readings in 120s-110s. reviewed meds: amlodipine 10 daily, coreg 6.25 BID, enalapril 20 daily, HCTZ 25 daily, saba 50 daily. Did not get blood tests as requested last visit.   ECG NSR LBBB MPI Aug '21: normal perfusion & EF Echo Aug '21: normal EF, mild LVH, nl LA, mild pul HTN "mild diast dysfx" scottie normal, renin reduced scottie/renin 24.6 BNP Aug '21 nl  refused renal US - could not afford. could not get sleep study at home & could not get pt to go to center.  PCP Dr. Thomson

## 2023-08-24 NOTE — ASSESSMENT
[FreeTextEntry1] : A/P:  -Cont. current meds -REturn 4 months w/ basic chem prior to visit. if still question re: elevated BPs will consider 24 hr BP cuff.

## 2023-12-20 LAB
ANION GAP SERPL CALC-SCNC: 10 MMOL/L
BUN SERPL-MCNC: 23 MG/DL
CALCIUM SERPL-MCNC: 9.8 MG/DL
CHLORIDE SERPL-SCNC: 103 MMOL/L
CO2 SERPL-SCNC: 25 MMOL/L
CREAT SERPL-MCNC: 1.36 MG/DL
EGFR: 39 ML/MIN/1.73M2
POTASSIUM SERPL-SCNC: 5 MMOL/L
SODIUM SERPL-SCNC: 139 MMOL/L

## 2023-12-28 ENCOUNTER — APPOINTMENT (OUTPATIENT)
Dept: CARDIOLOGY | Facility: CLINIC | Age: 83
End: 2023-12-28

## 2024-02-01 ENCOUNTER — APPOINTMENT (OUTPATIENT)
Dept: MAMMOGRAPHY | Facility: CLINIC | Age: 84
End: 2024-02-01
Payer: MEDICARE

## 2024-02-01 ENCOUNTER — OUTPATIENT (OUTPATIENT)
Dept: OUTPATIENT SERVICES | Facility: HOSPITAL | Age: 84
LOS: 1 days | End: 2024-02-01
Payer: MEDICARE

## 2024-02-01 ENCOUNTER — APPOINTMENT (OUTPATIENT)
Dept: RADIOLOGY | Facility: CLINIC | Age: 84
End: 2024-02-01
Payer: MEDICARE

## 2024-02-01 ENCOUNTER — APPOINTMENT (OUTPATIENT)
Dept: ULTRASOUND IMAGING | Facility: CLINIC | Age: 84
End: 2024-02-01
Payer: MEDICARE

## 2024-02-01 DIAGNOSIS — M85.80 OTHER SPECIFIED DISORDERS OF BONE DENSITY AND STRUCTURE, UNSPECIFIED SITE: ICD-10-CM

## 2024-02-01 DIAGNOSIS — Z00.8 ENCOUNTER FOR OTHER GENERAL EXAMINATION: ICD-10-CM

## 2024-02-01 PROCEDURE — 76536 US EXAM OF HEAD AND NECK: CPT | Mod: 26

## 2024-02-01 PROCEDURE — 77067 SCR MAMMO BI INCL CAD: CPT

## 2024-02-01 PROCEDURE — 77067 SCR MAMMO BI INCL CAD: CPT | Mod: 26

## 2024-02-01 PROCEDURE — 77080 DXA BONE DENSITY AXIAL: CPT | Mod: 26

## 2024-02-01 PROCEDURE — 77080 DXA BONE DENSITY AXIAL: CPT

## 2024-02-01 PROCEDURE — 77063 BREAST TOMOSYNTHESIS BI: CPT | Mod: 26

## 2024-02-01 PROCEDURE — 76536 US EXAM OF HEAD AND NECK: CPT

## 2024-02-01 PROCEDURE — 77063 BREAST TOMOSYNTHESIS BI: CPT

## 2024-03-14 ENCOUNTER — NON-APPOINTMENT (OUTPATIENT)
Age: 84
End: 2024-03-14

## 2024-03-14 ENCOUNTER — APPOINTMENT (OUTPATIENT)
Dept: CARDIOLOGY | Facility: CLINIC | Age: 84
End: 2024-03-14
Payer: MEDICARE

## 2024-03-14 VITALS
HEART RATE: 81 BPM | DIASTOLIC BLOOD PRESSURE: 80 MMHG | BODY MASS INDEX: 36.44 KG/M2 | SYSTOLIC BLOOD PRESSURE: 135 MMHG | OXYGEN SATURATION: 96 % | WEIGHT: 193 LBS | HEIGHT: 61 IN

## 2024-03-14 PROCEDURE — G2211 COMPLEX E/M VISIT ADD ON: CPT

## 2024-03-14 PROCEDURE — 93000 ELECTROCARDIOGRAM COMPLETE: CPT

## 2024-03-14 PROCEDURE — 99214 OFFICE O/P EST MOD 30 MIN: CPT

## 2024-03-14 NOTE — PHYSICAL EXAM
[Well Developed] : well developed [Well Nourished] : well nourished [Normal Conjunctiva] : normal conjunctiva [No Acute Distress] : no acute distress [Normal Venous Pressure] : normal venous pressure [No Carotid Bruit] : no carotid bruit [Normal S1, S2] : normal S1, S2 [No Murmur] : no murmur [No Rub] : no rub [No Gallop] : no gallop [Clear Lung Fields] : clear lung fields [Good Air Entry] : good air entry [No Respiratory Distress] : no respiratory distress  [Soft] : abdomen soft [Non Tender] : non-tender [No Masses/organomegaly] : no masses/organomegaly [Normal Bowel Sounds] : normal bowel sounds [Normal Gait] : normal gait [No Edema] : no edema [No Cyanosis] : no cyanosis [No Clubbing] : no clubbing [No Varicosities] : no varicosities [No Rash] : no rash [No Skin Lesions] : no skin lesions [Moves all extremities] : moves all extremities [Normal Speech] : normal speech [No Focal Deficits] : no focal deficits [Alert and Oriented] : alert and oriented [Normal memory] : normal memory

## 2024-03-22 NOTE — ASSESSMENT
[FreeTextEntry1] : A/P:  -Cont. current meds -Return in 4 months basic chem prior to visit given mildly worsening Cr.    =================  Addendum mar-22-'24: Needs clearance for colonoscopy. Low risk for cardiac events for low risk procedure. pt is cleared - OK to stop ASA if needed.

## 2024-03-22 NOTE — HISTORY OF PRESENT ILLNESS
[FreeTextEntry1] : 84 y/o initially referred for preop clearance given LBBB on ECG jul '21 continues to follow for severe HTN.  -HTN-on 4 meds-since age 27 -DM on metformin. -no other cardiac history  BPs at home - 120/71 manual /70 by me in office.  no chest pain, dyspnea, palpitations.  reviewed meds: amlodipine 10mg daily, enalapril 20 daily, HCTZ 25 daily ,  saba 50 daily, coreg 6.25 BID  Dec '23: Cr 1.36, GFR 40 priors cr 1.1- 1.0 GFR 47-57  ECG NSR LBBB MPI Aug '21: normal perfusion & EF Echo Aug '21: normal EF, mild LVH, nl LA, mild pul HTN "mild diast dysfx" scottie normal, renin reduced scottie/renin 24.6 BNP Aug '21 nl refused renal US - could not afford. could not get sleep study at home & could not get pt to go to center.  PCP Dr. Thomson

## 2024-07-11 ENCOUNTER — APPOINTMENT (OUTPATIENT)
Dept: CARDIOLOGY | Facility: CLINIC | Age: 84
End: 2024-07-11
Payer: MEDICARE

## 2024-07-11 ENCOUNTER — NON-APPOINTMENT (OUTPATIENT)
Age: 84
End: 2024-07-11

## 2024-07-11 VITALS — OXYGEN SATURATION: 98 % | HEART RATE: 72 BPM | SYSTOLIC BLOOD PRESSURE: 145 MMHG | DIASTOLIC BLOOD PRESSURE: 89 MMHG

## 2024-07-11 PROCEDURE — 99214 OFFICE O/P EST MOD 30 MIN: CPT

## 2024-07-11 PROCEDURE — G2211 COMPLEX E/M VISIT ADD ON: CPT

## 2024-07-11 PROCEDURE — 93000 ELECTROCARDIOGRAM COMPLETE: CPT

## 2024-07-11 RX ORDER — HYDRALAZINE HYDROCHLORIDE 25 MG/1
25 TABLET ORAL TWICE DAILY
Qty: 60 | Refills: 3 | Status: ACTIVE | COMMUNITY
Start: 2024-07-11 | End: 1900-01-01

## 2024-07-30 ENCOUNTER — LABORATORY RESULT (OUTPATIENT)
Age: 84
End: 2024-07-30

## 2024-07-30 ENCOUNTER — APPOINTMENT (OUTPATIENT)
Dept: NEPHROLOGY | Facility: CLINIC | Age: 84
End: 2024-07-30
Payer: MEDICARE

## 2024-07-30 VITALS
OXYGEN SATURATION: 97 % | WEIGHT: 189 LBS | TEMPERATURE: 96.6 F | HEART RATE: 69 BPM | HEIGHT: 61 IN | SYSTOLIC BLOOD PRESSURE: 122 MMHG | DIASTOLIC BLOOD PRESSURE: 66 MMHG | BODY MASS INDEX: 35.68 KG/M2

## 2024-07-30 DIAGNOSIS — Z86.69 PERSONAL HISTORY OF OTHER DISEASES OF THE NERVOUS SYSTEM AND SENSE ORGANS: ICD-10-CM

## 2024-07-30 DIAGNOSIS — Z86.79 PERSONAL HISTORY OF OTHER DISEASES OF THE CIRCULATORY SYSTEM: ICD-10-CM

## 2024-07-30 DIAGNOSIS — Z86.39 PERSONAL HISTORY OF OTHER ENDOCRINE, NUTRITIONAL AND METABOLIC DISEASE: ICD-10-CM

## 2024-07-30 DIAGNOSIS — N17.9 ACUTE KIDNEY FAILURE, UNSPECIFIED: ICD-10-CM

## 2024-07-30 DIAGNOSIS — K46.9 UNSPECIFIED ABDOMINAL HERNIA W/OUT OBSTRUCTION OR GANGRENE: ICD-10-CM

## 2024-07-30 DIAGNOSIS — Z86.018 PERSONAL HISTORY OF OTHER BENIGN NEOPLASM: ICD-10-CM

## 2024-07-30 PROCEDURE — 99205 OFFICE O/P NEW HI 60 MIN: CPT

## 2024-07-30 PROCEDURE — 36415 COLL VENOUS BLD VENIPUNCTURE: CPT

## 2024-07-30 NOTE — HISTORY OF PRESENT ILLNESS
[FreeTextEntry1] : here for eval of elevated scr from 1.36 12/15 to 1,65 7/8 no nsaid use, has OA  no ivc  no recent abx use  has been complaint will all her meds taking in po  has hx of uterine prolapse with incomplete bladder emptying  has declined intervention  4 vaginal deliveries   HM for colonsopy pending  mammogram utd

## 2024-07-30 NOTE — ASSESSMENT
[FreeTextEntry1] : ================================================================ # PINA  - ? due to retention from uterine prolapse, no hx of - UA  - US kdiney bladder with PVR - no nsaid advocated (had used Alleve in past)  - inc hydration - bw to r/o etiologies   1 month fu

## 2024-07-30 NOTE — PHYSICAL EXAM
[General Appearance - Alert] : alert [General Appearance - In No Acute Distress] : in no acute distress [Sclera] : the sclera and conjunctiva were normal [Outer Ear] : the ears and nose were normal in appearance [Neck Appearance] : the appearance of the neck was normal [] : no respiratory distress [Respiration, Rhythm And Depth] : normal respiratory rhythm and effort [Auscultation Breath Sounds / Voice Sounds] : lungs were clear to auscultation bilaterally [Heart Rate And Rhythm] : heart rate was normal and rhythm regular [Heart Sounds] : normal S1 and S2 [Murmurs] : no murmurs [Edema] : there was no peripheral edema [Bowel Sounds] : normal bowel sounds [Abdomen Soft] : soft [Abdomen Tenderness] : non-tender [No CVA Tenderness] : no ~M costovertebral angle tenderness [Involuntary Movements] : no involuntary movements were seen [No Focal Deficits] : no focal deficits [Oriented To Time, Place, And Person] : oriented to person, place, and time

## 2024-08-02 ENCOUNTER — RX CHANGE (OUTPATIENT)
Age: 84
End: 2024-08-02

## 2024-08-02 LAB
ALBUMIN MFR SERPL ELPH: 54.2 %
ALBUMIN SERPL ELPH-MCNC: 4.4 G/DL
ALBUMIN SERPL-MCNC: 4 G/DL
ALBUMIN/GLOB SERPL: 1.2 RATIO
ALPHA1 GLOB MFR SERPL ELPH: 4.7 %
ALPHA1 GLOB SERPL ELPH-MCNC: 0.3 G/DL
ALPHA2 GLOB MFR SERPL ELPH: 10.2 %
ALPHA2 GLOB SERPL ELPH-MCNC: 0.7 G/DL
ANA SER IF-ACNC: NEGATIVE
ANION GAP SERPL CALC-SCNC: 14 MMOL/L
APPEARANCE: CLEAR
B-GLOBULIN MFR SERPL ELPH: 14.5 %
B-GLOBULIN SERPL ELPH-MCNC: 1.1 G/DL
BACTERIA: ABNORMAL /HPF
BILIRUBIN URINE: NEGATIVE
BLOOD URINE: NEGATIVE
BUN SERPL-MCNC: 32 MG/DL
C3 SERPL-MCNC: 160 MG/DL
C4 SERPL-MCNC: 45 MG/DL
CALCIUM SERPL-MCNC: 10.1 MG/DL
CALCIUM SERPL-MCNC: 10.1 MG/DL
CAST: 0 /LPF
CHLORIDE SERPL-SCNC: 102 MMOL/L
CO2 SERPL-SCNC: 21 MMOL/L
COLOR: YELLOW
CREAT SERPL-MCNC: 1.71 MG/DL
CREAT SPEC-SCNC: 44 MG/DL
CREAT SPEC-SCNC: 44 MG/DL
CREAT/PROT UR: 0.1 RATIO
CRP SERPL-MCNC: <3 MG/L
DEPRECATED KAPPA LC FREE/LAMBDA SER: 2.4 RATIO
EGFR: 30 ML/MIN/1.73M2
EPITHELIAL CELLS: 7 /HPF
ERYTHROCYTE [SEDIMENTATION RATE] IN BLOOD BY WESTERGREN METHOD: 51 MM/HR
GAMMA GLOB FLD ELPH-MCNC: 1.2 G/DL
GAMMA GLOB MFR SERPL ELPH: 16.4 %
GBM AB TITR SER IF: <0.2
GLUCOSE QUALITATIVE U: NEGATIVE MG/DL
GLUCOSE SERPL-MCNC: 107 MG/DL
INTERPRETATION SERPL IEP-IMP: NORMAL
KAPPA LC CSF-MCNC: 1.99 MG/DL
KAPPA LC SERPL-MCNC: 4.78 MG/DL
KETONES URINE: NEGATIVE MG/DL
LEUKOCYTE ESTERASE URINE: ABNORMAL
M PROTEIN SPEC IFE-MCNC: NORMAL
MICROALBUMIN 24H UR DL<=1MG/L-MCNC: <1.2 MG/DL
MICROALBUMIN/CREAT 24H UR-RTO: NORMAL MG/G
MICROSCOPIC-UA: NORMAL
NITRITE URINE: NEGATIVE
PARATHYROID HORMONE INTACT: 47 PG/ML
PH URINE: 7
PHOSPHATE SERPL-MCNC: 3 MG/DL
POTASSIUM SERPL-SCNC: 4.5 MMOL/L
PROT SERPL-MCNC: 7.3 G/DL
PROT SERPL-MCNC: 7.3 G/DL
PROT UR-MCNC: 4 MG/DL
PROTEIN URINE: NEGATIVE MG/DL
RED BLOOD CELLS URINE: 0 /HPF
RHEUMATOID FACT SER QL: 13 IU/ML
SODIUM SERPL-SCNC: 137 MMOL/L
SPECIFIC GRAVITY URINE: 1.01
STREP DNASE B TITR SER: 108 U/ML
UROBILINOGEN URINE: 0.2 MG/DL
WHITE BLOOD CELLS URINE: 2 /HPF

## 2024-08-22 ENCOUNTER — APPOINTMENT (OUTPATIENT)
Dept: CARDIOLOGY | Facility: CLINIC | Age: 84
End: 2024-08-22
Payer: MEDICARE

## 2024-08-22 VITALS
SYSTOLIC BLOOD PRESSURE: 128 MMHG | DIASTOLIC BLOOD PRESSURE: 79 MMHG | BODY MASS INDEX: 35.5 KG/M2 | HEIGHT: 61 IN | OXYGEN SATURATION: 98 % | HEART RATE: 69 BPM | WEIGHT: 188 LBS

## 2024-08-22 PROCEDURE — 93000 ELECTROCARDIOGRAM COMPLETE: CPT

## 2024-08-22 PROCEDURE — G2211 COMPLEX E/M VISIT ADD ON: CPT

## 2024-08-22 PROCEDURE — 99214 OFFICE O/P EST MOD 30 MIN: CPT

## 2024-08-22 NOTE — ASSESSMENT
[FreeTextEntry1] : A/P:  Cont. hydralazine 25 mg BID, coreg 6.25 BID, amlodipine 10 mg daily, hczt 25 daily, saba 50 mg daily.  Will see dr. pedroza in 1 month return in 2 months.

## 2024-08-22 NOTE — HISTORY OF PRESENT ILLNESS
[FreeTextEntry1] : [id number] MANINDER FRANCIS Jul 30 1940 [phone]  82 y/o initially referred for preop clearance given LBBB on ECG jul '21 continues to follow for severe HTN.  since last visit saw Dr. Pedroza for worsening Cr. per Dr. pedroza PINA may be due to retention from uterine prolapse UA, US kidneys w/ PVR o rdered, increased hydration reccomended. 1 month followup planned.  BPs on diary <130s consistently machine measurement 130/90. my measurement 120/70  No new cardiac symptoms.  ECG NSR LBBB MPI Aug '21: normal perfusion & EF Echo Aug '21: normal EF, mild LVH, nl LA, mild pul HTN "mild diast dysfx" scottie normal, renin reduced scottie/renin 24.6 BNP Aug '21 nl refused renal US - could not afford. could not get sleep study at home & could not get pt to go to center.  PCP Dr. Thomson

## 2024-09-11 ENCOUNTER — APPOINTMENT (OUTPATIENT)
Dept: NEPHROLOGY | Facility: CLINIC | Age: 84
End: 2024-09-11
Payer: MEDICARE

## 2024-09-11 VITALS
TEMPERATURE: 95.4 F | HEIGHT: 61 IN | SYSTOLIC BLOOD PRESSURE: 110 MMHG | WEIGHT: 188 LBS | BODY MASS INDEX: 35.5 KG/M2 | DIASTOLIC BLOOD PRESSURE: 64 MMHG | OXYGEN SATURATION: 97 % | HEART RATE: 56 BPM

## 2024-09-11 DIAGNOSIS — K59.09 OTHER CONSTIPATION: ICD-10-CM

## 2024-09-11 DIAGNOSIS — N17.9 ACUTE KIDNEY FAILURE, UNSPECIFIED: ICD-10-CM

## 2024-09-11 PROCEDURE — 99213 OFFICE O/P EST LOW 20 MIN: CPT

## 2024-09-11 RX ORDER — SENNOSIDES 8.6 MG/1
8.6 CAPSULE, GELATIN COATED ORAL
Qty: 60 | Refills: 3 | Status: ACTIVE | COMMUNITY
Start: 2024-09-11 | End: 1900-01-01

## 2024-09-13 NOTE — HISTORY OF PRESENT ILLNESS
[FreeTextEntry1] : has been doing well  did not get to sonogram due to scheduling  no new c/o  most recent addition has been the spironolactone with profound  control of bp

## 2024-09-13 NOTE — REASON FOR VISIT
[Consultation] : a consultation visit [Follow-Up] : a follow-up visit [FreeTextEntry1] : elevated scr

## 2024-09-13 NOTE — ASSESSMENT
[FreeTextEntry1] : ================================================================ # PINA  - ? due to retention from uterine prolapse, no hx of - UA neg - US kdiney bladder with PVR penmding and appt moved up  - no nsaid advocated (had used Alleve in past)  - cw inc hydration - bw to r/o etiologies all neg  - pt aware that his could be prolapse related vs diuretics intro with eventual control of bp   2 month fu

## 2024-09-20 ENCOUNTER — APPOINTMENT (OUTPATIENT)
Dept: ULTRASOUND IMAGING | Facility: CLINIC | Age: 84
End: 2024-09-20
Payer: MEDICARE

## 2024-09-20 ENCOUNTER — OUTPATIENT (OUTPATIENT)
Dept: OUTPATIENT SERVICES | Facility: HOSPITAL | Age: 84
LOS: 1 days | End: 2024-09-20
Payer: MEDICARE

## 2024-09-20 DIAGNOSIS — Z00.8 ENCOUNTER FOR OTHER GENERAL EXAMINATION: ICD-10-CM

## 2024-09-20 DIAGNOSIS — N17.9 ACUTE KIDNEY FAILURE, UNSPECIFIED: ICD-10-CM

## 2024-09-20 PROCEDURE — 76770 US EXAM ABDO BACK WALL COMP: CPT

## 2024-09-23 PROCEDURE — 76770 US EXAM ABDO BACK WALL COMP: CPT | Mod: 26

## 2024-10-24 ENCOUNTER — NON-APPOINTMENT (OUTPATIENT)
Age: 84
End: 2024-10-24

## 2024-10-24 ENCOUNTER — APPOINTMENT (OUTPATIENT)
Dept: CARDIOLOGY | Facility: CLINIC | Age: 84
End: 2024-10-24
Payer: MEDICARE

## 2024-10-24 VITALS
HEIGHT: 61 IN | WEIGHT: 187 LBS | HEART RATE: 71 BPM | OXYGEN SATURATION: 99 % | DIASTOLIC BLOOD PRESSURE: 86 MMHG | BODY MASS INDEX: 35.3 KG/M2 | SYSTOLIC BLOOD PRESSURE: 129 MMHG

## 2024-10-24 PROCEDURE — 99214 OFFICE O/P EST MOD 30 MIN: CPT

## 2024-10-24 PROCEDURE — 93000 ELECTROCARDIOGRAM COMPLETE: CPT

## 2024-10-24 PROCEDURE — G2211 COMPLEX E/M VISIT ADD ON: CPT

## 2024-11-07 ENCOUNTER — APPOINTMENT (OUTPATIENT)
Dept: GASTROENTEROLOGY | Facility: CLINIC | Age: 84
End: 2024-11-07

## 2024-11-15 ENCOUNTER — APPOINTMENT (OUTPATIENT)
Dept: RADIOLOGY | Facility: CLINIC | Age: 84
End: 2024-11-15

## 2024-11-15 ENCOUNTER — OUTPATIENT (OUTPATIENT)
Dept: OUTPATIENT SERVICES | Facility: HOSPITAL | Age: 84
LOS: 1 days | End: 2024-11-15
Payer: MEDICARE

## 2024-11-15 DIAGNOSIS — Z00.8 ENCOUNTER FOR OTHER GENERAL EXAMINATION: ICD-10-CM

## 2024-11-15 PROCEDURE — 73620 X-RAY EXAM OF FOOT: CPT

## 2024-11-15 PROCEDURE — 73620 X-RAY EXAM OF FOOT: CPT | Mod: 26,LT

## 2024-12-16 ENCOUNTER — LABORATORY RESULT (OUTPATIENT)
Age: 84
End: 2024-12-16

## 2024-12-17 ENCOUNTER — APPOINTMENT (OUTPATIENT)
Dept: NEPHROLOGY | Facility: CLINIC | Age: 84
End: 2024-12-17
Payer: MEDICARE

## 2024-12-17 VITALS
SYSTOLIC BLOOD PRESSURE: 128 MMHG | DIASTOLIC BLOOD PRESSURE: 76 MMHG | HEART RATE: 60 BPM | WEIGHT: 190 LBS | HEIGHT: 61 IN | BODY MASS INDEX: 35.87 KG/M2 | OXYGEN SATURATION: 97 % | TEMPERATURE: 96.1 F

## 2024-12-17 DIAGNOSIS — N17.9 ACUTE KIDNEY FAILURE, UNSPECIFIED: ICD-10-CM

## 2024-12-17 DIAGNOSIS — R39.15 URGENCY OF URINATION: ICD-10-CM

## 2024-12-17 PROCEDURE — 99213 OFFICE O/P EST LOW 20 MIN: CPT

## 2025-02-19 ENCOUNTER — OUTPATIENT (OUTPATIENT)
Dept: OUTPATIENT SERVICES | Facility: HOSPITAL | Age: 85
LOS: 1 days | End: 2025-02-19
Payer: MEDICARE

## 2025-02-19 ENCOUNTER — APPOINTMENT (OUTPATIENT)
Dept: MAMMOGRAPHY | Facility: CLINIC | Age: 85
End: 2025-02-19
Payer: MEDICARE

## 2025-02-19 DIAGNOSIS — R91.1 SOLITARY PULMONARY NODULE: ICD-10-CM

## 2025-02-19 DIAGNOSIS — Z00.8 ENCOUNTER FOR OTHER GENERAL EXAMINATION: ICD-10-CM

## 2025-02-19 PROCEDURE — 77063 BREAST TOMOSYNTHESIS BI: CPT | Mod: 26

## 2025-02-19 PROCEDURE — 77067 SCR MAMMO BI INCL CAD: CPT

## 2025-02-19 PROCEDURE — 77063 BREAST TOMOSYNTHESIS BI: CPT

## 2025-02-19 PROCEDURE — 77067 SCR MAMMO BI INCL CAD: CPT | Mod: 26

## 2025-02-20 ENCOUNTER — APPOINTMENT (OUTPATIENT)
Dept: CARDIOLOGY | Facility: CLINIC | Age: 85
End: 2025-02-20
Payer: MEDICARE

## 2025-02-20 ENCOUNTER — NON-APPOINTMENT (OUTPATIENT)
Age: 85
End: 2025-02-20

## 2025-02-20 VITALS
BODY MASS INDEX: 35.12 KG/M2 | HEIGHT: 61 IN | HEART RATE: 78 BPM | SYSTOLIC BLOOD PRESSURE: 152 MMHG | DIASTOLIC BLOOD PRESSURE: 85 MMHG | OXYGEN SATURATION: 99 % | WEIGHT: 186 LBS

## 2025-02-20 PROCEDURE — G2211 COMPLEX E/M VISIT ADD ON: CPT

## 2025-02-20 PROCEDURE — 99215 OFFICE O/P EST HI 40 MIN: CPT

## 2025-02-20 PROCEDURE — 93000 ELECTROCARDIOGRAM COMPLETE: CPT

## 2025-03-05 ENCOUNTER — APPOINTMENT (OUTPATIENT)
Dept: MRI IMAGING | Facility: CLINIC | Age: 85
End: 2025-03-05
Payer: MEDICARE

## 2025-03-05 ENCOUNTER — APPOINTMENT (OUTPATIENT)
Dept: ULTRASOUND IMAGING | Facility: CLINIC | Age: 85
End: 2025-03-05
Payer: MEDICARE

## 2025-03-05 ENCOUNTER — OUTPATIENT (OUTPATIENT)
Dept: OUTPATIENT SERVICES | Facility: HOSPITAL | Age: 85
LOS: 1 days | End: 2025-03-05
Payer: MEDICARE

## 2025-03-05 DIAGNOSIS — Z00.8 ENCOUNTER FOR OTHER GENERAL EXAMINATION: ICD-10-CM

## 2025-03-05 PROCEDURE — 73718 MRI LOWER EXTREMITY W/O DYE: CPT | Mod: 26,LT

## 2025-03-05 PROCEDURE — 76536 US EXAM OF HEAD AND NECK: CPT | Mod: 26

## 2025-03-05 PROCEDURE — 76536 US EXAM OF HEAD AND NECK: CPT

## 2025-03-05 PROCEDURE — 73718 MRI LOWER EXTREMITY W/O DYE: CPT

## 2025-04-30 ENCOUNTER — NON-APPOINTMENT (OUTPATIENT)
Age: 85
End: 2025-04-30

## 2025-04-30 ENCOUNTER — APPOINTMENT (OUTPATIENT)
Dept: CARDIOLOGY | Facility: CLINIC | Age: 85
End: 2025-04-30
Payer: MEDICARE

## 2025-04-30 VITALS
HEART RATE: 64 BPM | WEIGHT: 184 LBS | BODY MASS INDEX: 34.74 KG/M2 | SYSTOLIC BLOOD PRESSURE: 125 MMHG | HEIGHT: 61 IN | DIASTOLIC BLOOD PRESSURE: 77 MMHG | OXYGEN SATURATION: 100 %

## 2025-04-30 PROCEDURE — 93000 ELECTROCARDIOGRAM COMPLETE: CPT

## 2025-04-30 PROCEDURE — 99214 OFFICE O/P EST MOD 30 MIN: CPT

## 2025-04-30 PROCEDURE — G2211 COMPLEX E/M VISIT ADD ON: CPT

## 2025-08-27 ENCOUNTER — APPOINTMENT (OUTPATIENT)
Dept: CARDIOLOGY | Facility: CLINIC | Age: 85
End: 2025-08-27
Payer: MEDICARE

## 2025-08-27 VITALS
SYSTOLIC BLOOD PRESSURE: 142 MMHG | BODY MASS INDEX: 34.55 KG/M2 | HEIGHT: 61 IN | OXYGEN SATURATION: 98 % | DIASTOLIC BLOOD PRESSURE: 82 MMHG | WEIGHT: 183 LBS | HEART RATE: 59 BPM

## 2025-08-27 PROCEDURE — G2211 COMPLEX E/M VISIT ADD ON: CPT

## 2025-08-27 PROCEDURE — 99214 OFFICE O/P EST MOD 30 MIN: CPT

## 2025-08-27 PROCEDURE — 93000 ELECTROCARDIOGRAM COMPLETE: CPT
